# Patient Record
Sex: FEMALE | Race: WHITE
[De-identification: names, ages, dates, MRNs, and addresses within clinical notes are randomized per-mention and may not be internally consistent; named-entity substitution may affect disease eponyms.]

---

## 2017-05-19 ENCOUNTER — HOSPITAL ENCOUNTER (OUTPATIENT)
Dept: HOSPITAL 39 - LAB.O | Age: 32
Discharge: HOME | End: 2017-05-19
Attending: FAMILY MEDICINE
Payer: COMMERCIAL

## 2017-05-19 DIAGNOSIS — E78.00: Primary | ICD-10-CM

## 2017-05-19 DIAGNOSIS — E61.2: ICD-10-CM

## 2017-05-19 DIAGNOSIS — E53.8: ICD-10-CM

## 2017-05-19 DIAGNOSIS — E55.9: ICD-10-CM

## 2017-05-19 DIAGNOSIS — D64.9: ICD-10-CM

## 2017-10-17 ENCOUNTER — HOSPITAL ENCOUNTER (OUTPATIENT)
Dept: HOSPITAL 39 - MRI | Age: 32
Discharge: HOME | End: 2017-10-17
Attending: FAMILY MEDICINE
Payer: COMMERCIAL

## 2017-10-17 DIAGNOSIS — R51: Primary | ICD-10-CM

## 2017-10-17 NOTE — MRI
EXAM DESCRIPTION:



MRA Head and/or Neck



CLINICAL HISTORY:



32 years, Female, HEADACHE 



COMPARISON:







FINDINGS:



Standard 3-D time-of-flight sequences. Maximum intensity

projection images.



In the posterior circulation both vertebral arteries about the

same size. Basilar artery unremarkable



Posterior communicating arteries are not clearly identified in

either side used either very small or congenitally absent. No

aneurysm in the [place]. Anterior communicating artery

appears patent. Anterior and middle cerebral arteries within

normal limits bilaterally.





IMPRESSION:



No aneurysm in the [place]. Patient appears to have

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

arteries bilaterally. Both vertebral arteries are about the same

size. Remainder of examination is unremarkable.









Electronically signed by:  Zak Gaytan MD  10/17/2017 3:00 PM CDT

Workstation: [id number]

## 2017-10-17 NOTE — MRI
EXAM DESCRIPTION:



Brain w/o Contrast



CLINICAL HISTORY:



32 years, Female, HEADACHE 



COMPARISON:







FINDINGS:



Standard triplanar sequences. Diffusion-weighted sequences joseluis

acute infarct. Gradient sequence is not show hemorrhage.

Ventricles and sulci within normal limits. White matter within

normal limits.



Some mild ethmoid sinus mucosal thickening. Mild mucosal

thickening in the maxillary sinuses bilaterally. No visualized

air-fluid levels.



IMPRESSION:



Unremarkable evaluation the brain. Mild ethmoid and maxillary

sinus mucosal thickening 



Electronically signed by:  Zak Gaytan MD  10/17/2017 2:41 PM CDT

Workstation: [id number]

## 2017-11-30 ENCOUNTER — HOSPITAL ENCOUNTER (EMERGENCY)
Dept: HOSPITAL 39 - ER | Age: 32
Discharge: HOME | End: 2017-11-30
Payer: SELF-PAY

## 2017-11-30 VITALS — DIASTOLIC BLOOD PRESSURE: 84 MMHG | SYSTOLIC BLOOD PRESSURE: 128 MMHG | TEMPERATURE: 96.1 F | OXYGEN SATURATION: 98 %

## 2017-11-30 DIAGNOSIS — Z88.0: ICD-10-CM

## 2017-11-30 DIAGNOSIS — L23.9: Primary | ICD-10-CM

## 2017-11-30 DIAGNOSIS — Z88.8: ICD-10-CM

## 2017-11-30 RX ADMIN — DEXAMETHASONE SODIUM PHOSPHATE ONE MG: 10 INJECTION INTRAMUSCULAR; INTRAVENOUS at 02:21

## 2017-11-30 RX ADMIN — CETIRIZINE HYDROCHLORIDE ONE MG: 10 TABLET, FILM COATED ORAL at 02:35

## 2017-11-30 NOTE — ED.PDOC
History of Present Illness





- General


Chief Complaint: Skin/Abrasion/Tear


Stated Complaint: hives


Time Seen by Provider: 11/30/17 02:09


Source: patient, RN notes reviewed, Vital Signs reviewed, EMS notes reviewed


Exam Limitations: no limitations





- History of Present Illness


Timing/Duration: 4-6 hours


Severity: moderate


Improving Factors: nothing


Worsening Factors: nothing


Associated Symptoms: denies symptoms - rash started at home, has had previously 

which was similar, no sob, cp or edema


Allergies/Adverse Reactions: 


Allergies





Cephalexin [From Keflex] Allergy (Unknown, Verified 11/30/17 02:07)


 


Diphenhydramine [From Benadryl] Allergy (Unknown, Verified 11/30/17 02:06)


 


Penicillin G Allergy (Verified 11/30/17 02:06)


 








Home Medications: 


Ambulatory Orders





Prednisone [Deltasone] 40 mg PO DAILY #10 tab 11/30/17 











Review of Systems





- Review of Systems


Constitutional: Denies: chills, fever


EENTM: Denies: eye pain, ear pain, nose pain, nose congestion, throat pain, 

throat swelling, mouth pain, mouth swelling


Respiratory: Denies: cough, orthopnea, short of breath, stridor, wheezing


Cardiology: Denies: chest pain, edema, palpitations, syncope


Gastrointestinal/Abdominal: Denies: abdominal pain, constipation, diarrhea, 

nausea


Musculoskeletal: Denies: joint pain, joint swelling, muscle pain, muscle 

stiffness


Skin: States: rash - macular papular rash diffuse with no skin sluffing


Neurological: Denies: headache, numbness, tingling





Physical Exam





- Physical Exam


General Appearance: Alert, No apparent distress


Ears, Nose, Throat: hearing grossly normal, normal ENT inspection, normal 

pharynx


Neck: non-tender, full range of motion, supple


Respiratory: chest non-tender, lungs clear, normal breath sounds, no 

respiratory distress


Cardiovascular/Chest: normal peripheral pulses, regular rate, rhythm, no edema, 

no murmur


Gastrointestinal/Abdominal: normal bowel sounds, non tender, soft


Back Exam: normal inspection


Extremity: normal range of motion, non-tender, normal inspection


Neurologic: no motor/sensory deficits, alert, normal mood/affect


Skin Exam: normal color, warm/dry





Departure





- Departure


Clinical Impression: 


 Rash due to allergy





Time of Disposition: 02:15


Disposition: Discharge to Home or Self Care


Condition: Good


Departure Forms:  ED Discharge - Pt. Copy, Patient Portal Self Enrollment


Instructions:  Allergy Testing


Diet: resume usual diet


Activity: increase activity as tolerated


Referrals: 


JULIAN CROUCH [Primary Care Provider] - 1-2 Weeks


Prescriptions: 


Prednisone [Deltasone] 40 mg PO DAILY #10 tab


Home Medications: 


Ambulatory Orders





Prednisone [Deltasone] 40 mg PO DAILY #10 tab 11/30/17 








Additional Instructions: 


return if worsening, problem/ concern

## 2018-01-24 ENCOUNTER — HOSPITAL ENCOUNTER (OUTPATIENT)
Dept: HOSPITAL 39 - LAB.O | Age: 33
End: 2018-01-24
Attending: FAMILY MEDICINE
Payer: COMMERCIAL

## 2018-01-24 DIAGNOSIS — E55.9: ICD-10-CM

## 2018-01-24 DIAGNOSIS — E78.00: Primary | ICD-10-CM

## 2018-01-24 DIAGNOSIS — D64.9: ICD-10-CM

## 2018-01-24 DIAGNOSIS — E11.9: ICD-10-CM

## 2018-01-24 DIAGNOSIS — E61.2: ICD-10-CM

## 2018-01-24 DIAGNOSIS — E53.8: ICD-10-CM

## 2018-02-26 ENCOUNTER — HOSPITAL ENCOUNTER (OUTPATIENT)
Dept: HOSPITAL 39 - CT | Age: 33
End: 2018-02-26
Attending: OTOLARYNGOLOGY
Payer: COMMERCIAL

## 2018-02-26 DIAGNOSIS — H93.13: Primary | ICD-10-CM

## 2018-02-26 DIAGNOSIS — J32.0: ICD-10-CM

## 2018-02-26 DIAGNOSIS — G50.1: ICD-10-CM

## 2018-02-26 NOTE — CT
EXAM DESCRIPTION: 

Sinuses: Computed Tomography.



CLINICAL HISTORY: 

CHRONIC MAXILLARY SINUSITIS



COMPARISON: 

None Available.



TECHNIQUE: 

Spiral, axial 2.5 mm scans through the maxillofacial bones

without contrast, soft tissue and bone algorithm. 2.0 mm

reconstructions.  Total Exam DLP: 275.93 mGy-cm.  This exam was

performed according to our departmental CT dose-optimization

program which includes automated exposure control, adjustment of

the mA and/or kV according to patient size and/or use of

iterative reconstruction technique; to reduce radiation dose to

as low as reasonably achievable (ALARA).



FINDINGS: 

Mucoperiosteal thickening in the left maxillary antra

predominantly at the base with minimal thickening of the right

antrum. No air-fluid levels bilaterally. Bilateral ostiomeatal

units are patent. Minimal mucoperiosteal thickening in the

bilateral ethmoid air cells. Bilateral sphenoid air cells are

small with mucoperiosteal thickening more on the left. Bilateral

frontal sinuses are well aerated with no mucosal thickening or

air-fluid levels.



Minimal turbinate mucosal hypertrophy bilaterally. Bilateral

gideon bullosa. Inferior posterior right septal spur. Mid septum

slightly deviated to the left. The included mastoid air cells are

unremarkable. Limited visualization of the internal auditory

canals and ossicles shows no gross findings. Minimal enlargement

of the bilateral adenoids with slight narrowing of the

nasopharynx.



IMPRESSION: 

1. Chronic sinusitis involving some of the air cells in the

sphenoid and ethmoid groups as well as minimal involvement of the

maxillary antra, left more than right. Ostiomeatal units are

patent. No acute sinusitis.

2. Minimal septal deviation minimal narrowing of the bilateral

nasal passageways with bilateral gideon bullosa.

3. Minimal enlargement of the bilateral adenoids with slight

narrowing of the nasopharynx.



Electronically signed by:  Yury Owens MD  2/26/2018 9:10 PM CST

Workstation: [id number]

## 2018-07-10 ENCOUNTER — HOSPITAL ENCOUNTER (OUTPATIENT)
Dept: HOSPITAL 39 - LAB | Age: 33
End: 2018-07-10
Attending: FAMILY MEDICINE
Payer: COMMERCIAL

## 2018-07-10 DIAGNOSIS — D64.9: ICD-10-CM

## 2018-07-10 DIAGNOSIS — E55.9: ICD-10-CM

## 2018-07-10 DIAGNOSIS — E78.00: Primary | ICD-10-CM

## 2018-07-10 DIAGNOSIS — E11.9: ICD-10-CM

## 2018-07-10 DIAGNOSIS — E53.8: ICD-10-CM

## 2018-07-10 DIAGNOSIS — E61.2: ICD-10-CM

## 2018-07-18 ENCOUNTER — HOSPITAL ENCOUNTER (OUTPATIENT)
Dept: HOSPITAL 39 - LAB.O | Age: 33
End: 2018-07-18
Attending: FAMILY MEDICINE
Payer: COMMERCIAL

## 2018-07-18 DIAGNOSIS — R53.83: Primary | ICD-10-CM

## 2019-08-09 ENCOUNTER — HOSPITAL ENCOUNTER (OUTPATIENT)
Dept: HOSPITAL 39 - MRI | Age: 34
End: 2019-08-09
Attending: FAMILY MEDICINE
Payer: COMMERCIAL

## 2019-08-09 DIAGNOSIS — M51.26: Primary | ICD-10-CM

## 2019-08-09 DIAGNOSIS — M48.061: ICD-10-CM

## 2019-08-09 DIAGNOSIS — M43.16: ICD-10-CM

## 2019-08-09 NOTE — MRI
EXAM DESCRIPTION: 

Lumbar Spine w/o Contrast : Magnetic Resonance Imaging.



CLINICAL HISTORY: 

M54.9. Low back pain, unspecified.



COMPARISON: 

None.



TECHNIQUE: 

Multiplanar, multiple standard sequences, non contrast MRI,

lumbar spine.



FINDINGS: 

L5-S1: The disc space is well visualized on T2 axial series 501,

image 3. Disc desiccation and minimal disc space loss. No

significant bulging. Minimal thickening of the posterior flavum

ligaments. Bilateral pedicle shortening. AP canal diameter 9.5

mm. Bilateral mild foraminal stenosis.



L4-L5: Disc desiccation with disc space preserved. No significant

bulging. Bilateral mild hypertrophic facet arthrosis and

thickened ligaments. Bilateral shortened pedicles. AP canal

diameter 8.5 mm. Bilateral mild foraminal stenosis more left than

right.



L3-L4: Disc desiccation. Disc space maintained. Posterior right

paracentral 5 mm protrusion of the disc in the midline into the

right of midline impressing on the right ventral thecal sac and

effacing the right subarticular recess, abutting the descending

right L4 nerve. Bilateral mild hypertrophic facet arthrosis and

thickened ligaments. Bilaterally shortened pedicles. AP canal

diameter 7 mm. Moderate to severe bilateral foraminal narrowing. 



L2-L3: Disc space maintained with normal signal in the disc. No

posterior bulge. Bilateral facet arthrosis and flavum ligament

hypertrophy. Bilateral shortened pedicles. Bilateral narrowing of

subarticular recesses. AP canal diameter 8.7 mm. Moderate to

severe bilateral foraminal narrowing.



L1-L2: Disc desiccation and disc space maintained. Trace

retrolisthesis. Posterior broad-based bulge. Mild bilateral

ligament thickening and facet hypertrophic arthrosis. Bilaterally

shortened pedicles. AP canal diameter 10 mm. Bilateral mild to

moderate foraminal narrowing.



T12-L1: Disc desiccation and minimal disc space loss. Grade 1

retrolisthesis 2 mm. Posterior broad-based disc bulge abutting

the cord. Conus terminates mid L1 body level. Bilateral mild

foraminal narrowing..



Normal curvature of the spine.  Paravertebral soft tissues

unremarkable. Cord normal signal and caliber.  Normal marrow

signal in the remaining vertebral bodies and the posterior

elements. Vertebral bodies are not compressed at any level.



IMPRESSION: 

1. Multiple levels of bilateral pedicle narrowing and hypertrophy

of the facets and thickening of the posterior ligaments resulting

in multiple levels of borderline to moderate canal stenosis, and

multiple levels of unilateral or bilateral foraminal stenosis.

2. Right paracentral posterior protrusion of the L3-4 disc

encroaching on the thecal sac and effacing the right subarticular

recess. Moderate central canal stenosis. Correlate for right L4

radiculopathy. Moderate to severe bilateral foraminal narrowing

3. L1-2 retrolisthesis with borderline mild central canal

stenosis. Borderline mild central canal stenosis at L5-S1. Mild

to moderate central canal stenosis at L2-3 which is

multifactorial.

4. Unilateral or bilateral foraminal stenosis at L5-S1, and L4-5.

Moderate to severe bilateral foraminal narrowing L3-4, and L2-3.



Electronically signed by:  Yury Owens MD  8/9/2019 2:43 PM CDT

Workstation: [id number]

## 2019-09-14 ENCOUNTER — HOSPITAL ENCOUNTER (OUTPATIENT)
Dept: HOSPITAL 39 - LAB.O | Age: 34
End: 2019-09-14
Attending: FAMILY MEDICINE
Payer: COMMERCIAL

## 2019-09-14 DIAGNOSIS — E78.00: Primary | ICD-10-CM

## 2019-09-14 DIAGNOSIS — R53.83: ICD-10-CM

## 2019-09-14 DIAGNOSIS — E53.8: ICD-10-CM

## 2019-09-14 DIAGNOSIS — E61.2: ICD-10-CM

## 2019-09-14 DIAGNOSIS — E55.9: ICD-10-CM

## 2019-09-14 DIAGNOSIS — E11.9: ICD-10-CM

## 2020-04-11 ENCOUNTER — HOSPITAL ENCOUNTER (OUTPATIENT)
Dept: HOSPITAL 39 - LAB.O | Age: 35
End: 2020-04-11
Attending: FAMILY MEDICINE
Payer: COMMERCIAL

## 2020-04-11 DIAGNOSIS — E78.00: Primary | ICD-10-CM

## 2020-04-11 DIAGNOSIS — E53.8: ICD-10-CM

## 2020-04-11 DIAGNOSIS — E61.2: ICD-10-CM

## 2020-04-11 DIAGNOSIS — E11.9: ICD-10-CM

## 2020-04-11 DIAGNOSIS — E55.9: ICD-10-CM

## 2020-10-10 ENCOUNTER — HOSPITAL ENCOUNTER (OUTPATIENT)
Dept: HOSPITAL 39 - LAB.O | Age: 35
End: 2020-10-10
Attending: FAMILY MEDICINE
Payer: COMMERCIAL

## 2020-10-10 DIAGNOSIS — E78.00: Primary | ICD-10-CM

## 2020-10-10 DIAGNOSIS — E53.8: ICD-10-CM

## 2020-10-10 DIAGNOSIS — E11.9: ICD-10-CM

## 2020-10-10 DIAGNOSIS — E61.2: ICD-10-CM

## 2020-10-10 DIAGNOSIS — E03.9: ICD-10-CM

## 2020-10-10 DIAGNOSIS — E55.9: ICD-10-CM

## 2020-10-10 DIAGNOSIS — E60: ICD-10-CM
